# Patient Record
(demographics unavailable — no encounter records)

---

## 2020-01-01 NOTE — REPVR
PROCEDURE INFORMATION: 

Exam: US Retroperitoneal Limited, Kidneys 

Exam date and time: 2020 6:40 PM 

Age: 1 days old 

Clinical indication: Abnormal findings; Abnormal radiologic finding of the 

abdomen; Radiologic exam and body structure: Prenatal US showed left 

caliectasis 



TECHNIQUE: 

Imaging protocol: Real-time ultrasound of the retroperitoneum with image 

documentation. Examination was focused on the kidneys. 



COMPARISON: 

No relevant prior studies available. 



FINDINGS: 

Adrenals: The right adrenal gland is visualized from appears grossly within 

normal limits. The left adrenal gland is visualized and appears grossly 

unremarkable. 

Right kidney: The right kidney measures 4.3 x 2.3 x 2.0 cm. There is no right 

renal mass, stone, cyst or hydronephrosis. 

Left kidney: The left kidney measures 4.2 x 2.0 x 2.4 cm. There is no left 

renal mass, stone, cyst or hydronephrosis. 

Bladder: The urinary bladder is minimally distended with a total volume of 3.8 

cc and appears grossly unremarkable. 



IMPRESSION: 

Normal renal ultrasound with no evidence of pelviectasis or hydronephrosis. 



Electronically signed by: Ramone Clemons On 2020  07:55:59 AM

## 2020-01-01 NOTE — NBADM
[facility] Admission Note


Date of Admission


2020 at 13:08





History


This is a baby term male born at 38-6/7 weeks of gestational age via  

due to nonreassuring fetal status to a 34-year-old  (G) 2 para (P) now 1 

mother who is blood type A+, hepatitis B negative, rapid plasma reagin (RPR) 

negative, HIV negative, group B Streptococcus negative. Rupture of membranes 9 

hours and 23 minutes prior to delivery with clear fluid. Pregnancy was 

complicated by velamentous cord insertion. Apgar scores were 9 at one minute and

and 9 at five minutes. Baby was admitted to the Mother-Baby unit.





Physical Examination


Physical Measurements


On admission, the baby's weight is 3600 grams which is 7 pounds and 15 ounces, 

length is 20 inches, and head circumference is 14 inches.


Vital Signs





Vital Signs








  Date Time  Temp Pulse Resp B/P (MAP) Pulse Ox O2 Delivery O2 Flow Rate FiO2


 


20 14:04 98.0 160 40 65/30 (42)  Room Air  








General:  Positive: Active, Other (appropriately responsive); 


   Negative: Dysmorphic Features


HEENT:  Positive: Normocephalic, Anterior [place] Open, Positive Red Reflexes

[place]


Heart:  Positive: S1,S2; 


   Negative: Murmur


Lungs:  Positive: Good Bilateral Air Entry; 


   Negative: Grunting and Retractions


Abdomen:  Positive: Soft; 


   Negative: Distended


Male Genitalia:  Positive: Nl Term Male Genitalia


Anus:  Positive: Patent


Extremities:  Positive: Other (both hips stable with normal Ortolani and Michel 

maneuvers)


Skin:  Positive: Normal for Gestation, Normal Capillary Refill


Neurological:  POSITIVE: Good Tone, Positive [place] Reflex





Asessment


Problems:  


(1) Healthy male 


Problem Text:  Delivered by .








Plan


1. Admit to mother-baby unit.


2. Routine  care.


3. Both parents updated on condition and plan for the baby. Parents request 

circumcision for the child. I discussed the procedure with them and they gave 

informed consent. Prenatal ultrasound showed caliectasis of the left kidney. We 

will do a follow-up renal ultrasound.











Benjie Timmons MD                  2020 11:35

## 2020-01-01 NOTE — DSES
DATE OF BIRTH/ADMISSION:  2020

DATE OF DISCHARGE:  2020

 

DIAGNOSES:

1.  Term male  delivered by  section.

2.  Rule out hydronephrosis due to abnormal prenatal ultrasound.

 

PROCEDURES DURING HOSPITALIZATION:

1.  Renal ultrasound.

2.  Circumcision performed 2020 by Dr. Timmons.

3.  Hearing screen.

4.  BiliChek.

 

HISTORY:  This child is a term male  who was delivered by  section

due to nonreassuring fetal status at [facility] on the afternoon of

2020.  Mother is 34 years old,  2, now para 1.  Her blood type is

A+.  Her group B Streptococcus screen was negative.  Her hepatitis B surface

antigen, RPR and HIV status were all negative.  Rupture of membranes occurred 9

hours and 23 minutes prior to delivery with clear fluid.  Pregnancy was

complicated by a velamentous cord insertion.  The child was given Apgar scores of

nine at 1 minute and nine at 5 minutes.  Birthweight 3600 grams which is 7 pounds

15 ounces, length 20 inches, head circumference 14 inches.  Home physical

examination was normal.  The child was given his initial hepatitis B vaccination

on his day of delivery.  I circumcised the child on 2020 with a Gomco clamp

and local anesthesia.  The procedure was uncomplicated and well tolerated.

Prenatal ultrasound showed caliectasis of the left kidney.  We did a followup

renal ultrasound to rule out hydronephrosis.  The renal ultrasound showed normal

kidneys on both the right and left with no signs of hydronephrosis.  The child

passed a hearing screen.  He was discharged to home in good condition to his

parents' care on 2020.  He is now 2 days postdelivery.  His weight on the

day of discharge is 3310 grams which is 7 pounds 5 ounces.  On the day of

discharge, the child was active and vigorous.  He had no clinical jaundice with a

BiliChek of zero and he was breastfeeding well.  His circumcision is healing

well.  I instructed his parents to continue to apply Vaseline with each diaper

change for two more days.  The child's followup care is going to be at the

[facility].  The child was discharged on .  I instructed his

parents to call the [facility] on Monday to schedule his followup

checkups and I faxed a summary of his hospital course to the office for his

office records.